# Patient Record
Sex: MALE | Race: WHITE | NOT HISPANIC OR LATINO | URBAN - METROPOLITAN AREA
[De-identification: names, ages, dates, MRNs, and addresses within clinical notes are randomized per-mention and may not be internally consistent; named-entity substitution may affect disease eponyms.]

---

## 2024-01-19 PROBLEM — E78.5 HYPERLIPIDEMIA: Status: ACTIVE | Noted: 2024-01-19

## 2024-01-19 PROBLEM — G47.33 OBSTRUCTIVE SLEEP APNEA SYNDROME: Status: ACTIVE | Noted: 2024-01-19

## 2024-01-19 PROBLEM — R36.1 HEMOSPERMIA: Status: ACTIVE | Noted: 2024-01-19

## 2024-01-19 PROBLEM — E11.9 TYPE 2 DIABETES MELLITUS (MULTI): Status: ACTIVE | Noted: 2024-01-19

## 2024-01-19 PROBLEM — I10 ESSENTIAL HYPERTENSION: Status: ACTIVE | Noted: 2024-01-19

## 2024-01-19 PROBLEM — E11.65 HYPERGLYCEMIA DUE TO TYPE 2 DIABETES MELLITUS (MULTI): Status: ACTIVE | Noted: 2024-01-19

## 2024-01-19 RX ORDER — ASPIRIN 325 MG
TABLET ORAL
COMMUNITY
End: 2024-04-19 | Stop reason: WASHOUT

## 2024-01-19 RX ORDER — LATANOPROST 50 UG/ML
1 SOLUTION/ DROPS OPHTHALMIC NIGHTLY
COMMUNITY
Start: 2023-11-02

## 2024-01-19 RX ORDER — DAPAGLIFLOZIN 10 MG/1
TABLET, FILM COATED ORAL EVERY 24 HOURS
COMMUNITY
Start: 2023-08-25 | End: 2024-01-22 | Stop reason: SDUPTHER

## 2024-01-19 RX ORDER — TIZANIDINE 4 MG/1
TABLET ORAL EVERY 8 HOURS
COMMUNITY
Start: 2022-11-14 | End: 2024-01-22 | Stop reason: WASHOUT

## 2024-01-19 RX ORDER — PIOGLITAZONEHYDROCHLORIDE 15 MG/1
TABLET ORAL EVERY 24 HOURS
COMMUNITY
Start: 2020-09-28 | End: 2024-01-22 | Stop reason: WASHOUT

## 2024-01-19 RX ORDER — LISINOPRIL 10 MG/1
TABLET ORAL EVERY 24 HOURS
COMMUNITY
End: 2024-02-18

## 2024-01-19 RX ORDER — METFORMIN HYDROCHLORIDE 500 MG/1
1000 TABLET, EXTENDED RELEASE ORAL 2 TIMES DAILY
Qty: 360 TABLET | Refills: 0 | OUTPATIENT
Start: 2024-01-19

## 2024-01-19 RX ORDER — METFORMIN HYDROCHLORIDE 500 MG/1
TABLET, EXTENDED RELEASE ORAL EVERY 12 HOURS
COMMUNITY
End: 2024-01-22 | Stop reason: SDUPTHER

## 2024-01-19 RX ORDER — ATORVASTATIN CALCIUM 20 MG/1
1 TABLET, FILM COATED ORAL DAILY
COMMUNITY
End: 2024-02-18

## 2024-01-20 PROBLEM — Z79.84 LONG TERM (CURRENT) USE OF ORAL HYPOGLYCEMIC DRUGS: Status: ACTIVE | Noted: 2024-01-20

## 2024-01-20 NOTE — PROGRESS NOTES
HPI:       Diabetes Mellitus:          The patient is complaining of nothing; he is overdue for appt for [facility] dm. Last seen in Aug 23.  At that time he was started on farxiga, his pioglitazone stopped due to possible wt gain; he remained on his metformin.  He has not lost any wt being off pioglitazone for 6 months.         Hypoglycemic episodes are none         The results of the last HbgA1c were increased at 7.6; today it is 7.9         The microalbumin normal         The feet/last exam done 8/25/23 and normal         Last eye exam 8/2022 and pt has been reminded he is overdue         Side effects of the medications include wt gain with pioglitazone         Compliance with the medical regimen has been Fair       Hypertension:          The patient has no issues         The patients cardiovascular risk factors include:         Cardiac Risk Factors  Age > 45-male, > 55-female:  YES  +1   Smoking:   NO   Sig. family hx of CHD*:  YES  +1   Hypertension:   YES  +1   Diabetes:   YES  +1   HDL < 35:   NO   HDL > 59:   NO   Total: 4     *- Sig. family h/o CHD per NCEP = MI or sudden death at <54yo in   father or other 1st-degree male relative, or <64yo in mother or   other 1st-degree female relative           The patients adherence to the treatment regimen is Fair         Responses to the medications has been Fair    Hyperlipidemia:   The patient does not use medications that may worsen dyslipidemias (corticosteroids, progestins, anabolic steroids, diuretics, beta-blockers, amiodarone, cyclosporine, olanzapine). The patient exercises infrequently.  The patient is not known to have coexisting coronary artery disease.      MEDICAL HISTORY:   Past Medical History:   Diagnosis Date    Hematospermia     History of COVID-19 12/2021    HTN (hypertension)     Hyperlipidemia     Long term (current) use of oral hypoglycemic drugs     GUANAKITO (obstructive sleep apnea)     Uncontrolled type 2 diabetes mellitus with hyperglycemia (CMS/[facility])       MEDICATIONS:   Current Outpatient Medications   Medication Sig Dispense Refill    aspirin 325 mg tablet 1 tablet Orally AS NEEDED      atorvastatin (Lipitor) 20 mg tablet Take 1 tablet (20 mg) by mouth once daily.      latanoprost (Xalatan) 0.005 % ophthalmic solution Administer 1 drop into both eyes once daily at bedtime.      lisinopril 10 mg tablet once every 24 hours.      MULTIVITAMIN ORAL once every 24 hours.      Farxiga 10 mg Take 1 tablet (10 mg) by mouth once every 24 hours. 30 tablet 2    metFORMIN  mg 24 hr tablet Take 1 tablet (500 mg) by mouth every 12 hours. 60 tablet 2    semaglutide (Rybelsus) 3 mg tablet Take 1 tablet (3 mg) by mouth once daily. 30 tablet 0     No current facility-administered medications for this visit.     ALLERGIES:   Allergies   Allergen Reactions    Shellfish Containing Products Unknown     FAMILY HISTORY:   Family History   Problem Relation Name Age of Onset    No Known Problems Mother      Hypertension Father      Coronary artery disease Father      Colon cancer Sister          age 53    No Known Problems Daughter      No Known Problems Daughter      No Known Problems Daughter       SOCIAL HISTORY:   Social History     Tobacco Use    Smoking status: Never    Smokeless tobacco: Never   Vaping Use    Vaping Use: Never used   Substance Use Topics    Alcohol use: Yes    Drug use: Never         ROS:      CONSTITUTION:  alert and oriented     OPHTHALMOLOGY:          no Change in vision.         CARDIOLOGY:          no Chest pain.  no Dyspnea on exertion.  no Shortness of breath.         ENDOCRINOLOGY:          no Excessive thirst.  no Excessive urination.  no Fatigue.  no Skin changes.  no Weight gain.  no Weight loss.         SKIN:          no Healing problems.         NEUROLOGY:          no Loss of sensation in specific body area.  no Burning pain in feet.  no Peripheral neuropathy.  no Tingling/numbness.    LABS: due     VITAL SIGNS:  /80   Pulse 72   Wt 103  kg (226 lb)   BMI 35.93 kg/m²     General Appearance: awake, alert, oriented, in no acute distress  Lungs: Normal expansion.  Clear to auscultation.  No rales, rhonchi, or wheezing.  Heart: Heart sounds are normal.  Regular rate and rhythm without murmur, gallop or rub.  Extremities: Extremities warm to touch, pink, with no edema.  Neurologic: Alert and oriented x 3, gait normal., reflexes normal and symmetric, strength and  sensation grossly normal    Thang was seen today for diabetes.  Diagnoses and all orders for this visit:  Uncontrolled type 2 diabetes mellitus with hyperglycemia (CMS/[facility]) (Primary)  -     Albumin , Urine Random; Future  -     Basic Metabolic Panel; Future  -     Urinalysis with Reflex Microscopic; Future  -     POCT glycosylated hemoglobin (Hb A1C) manually resulted  -     Farxiga 10 mg; Take 1 tablet (10 mg) by mouth once every 24 hours.  -     metFORMIN  mg 24 hr tablet; Take 1 tablet (500 mg) by mouth every 12 hours.  -     semaglutide (Rybelsus) 3 mg tablet; Take 1 tablet (3 mg) by mouth once daily.  -     Follow Up In Primary Care - Established; Future  Essential hypertension  Mixed hyperlipidemia  -     Lipid Panel; Future  Long term (current) use of oral hypoglycemic drugs

## 2024-01-22 ENCOUNTER — LAB (OUTPATIENT)
Dept: LAB | Facility: LAB | Age: 64
End: 2024-01-22
Payer: COMMERCIAL

## 2024-01-22 ENCOUNTER — OFFICE VISIT (OUTPATIENT)
Dept: PRIMARY CARE | Facility: CLINIC | Age: 64
End: 2024-01-22
Payer: COMMERCIAL

## 2024-01-22 VITALS
HEART RATE: 72 BPM | WEIGHT: 226 LBS | SYSTOLIC BLOOD PRESSURE: 128 MMHG | DIASTOLIC BLOOD PRESSURE: 80 MMHG | BODY MASS INDEX: 35.93 KG/M2

## 2024-01-22 DIAGNOSIS — E11.65 UNCONTROLLED TYPE 2 DIABETES MELLITUS WITH HYPERGLYCEMIA (MULTI): Primary | ICD-10-CM

## 2024-01-22 DIAGNOSIS — E78.2 MIXED HYPERLIPIDEMIA: ICD-10-CM

## 2024-01-22 DIAGNOSIS — I10 ESSENTIAL HYPERTENSION: ICD-10-CM

## 2024-01-22 DIAGNOSIS — E11.65 UNCONTROLLED TYPE 2 DIABETES MELLITUS WITH HYPERGLYCEMIA (MULTI): ICD-10-CM

## 2024-01-22 DIAGNOSIS — Z79.84 LONG TERM (CURRENT) USE OF ORAL HYPOGLYCEMIC DRUGS: ICD-10-CM

## 2024-01-22 LAB
ANION GAP SERPL CALC-SCNC: 11 MMOL/L (ref 10–20)
APPEARANCE UR: ABNORMAL
BILIRUB UR STRIP.AUTO-MCNC: NEGATIVE MG/DL
BUN SERPL-MCNC: 14 MG/DL (ref 6–23)
CALCIUM SERPL-MCNC: 9.6 MG/DL (ref 8.6–10.3)
CHLORIDE SERPL-SCNC: 105 MMOL/L (ref 98–107)
CHOLEST SERPL-MCNC: 122 MG/DL (ref 0–199)
CHOLESTEROL/HDL RATIO: 2.2
CO2 SERPL-SCNC: 28 MMOL/L (ref 21–32)
COLOR UR: YELLOW
CREAT SERPL-MCNC: 0.87 MG/DL (ref 0.5–1.3)
CREAT UR-MCNC: 102.4 MG/DL (ref 20–370)
EGFRCR SERPLBLD CKD-EPI 2021: >90 ML/MIN/1.73M*2
GLUCOSE SERPL-MCNC: 168 MG/DL (ref 74–99)
GLUCOSE UR STRIP.AUTO-MCNC: NEGATIVE MG/DL
HDLC SERPL-MCNC: 56.7 MG/DL
KETONES UR STRIP.AUTO-MCNC: NEGATIVE MG/DL
LDLC SERPL CALC-MCNC: 54 MG/DL
LEUKOCYTE ESTERASE UR QL STRIP.AUTO: NEGATIVE
MICROALBUMIN UR-MCNC: 14.2 MG/L
MICROALBUMIN/CREAT UR: 13.9 UG/MG CREAT
NITRITE UR QL STRIP.AUTO: NEGATIVE
NON HDL CHOLESTEROL: 65 MG/DL (ref 0–149)
PH UR STRIP.AUTO: 5 [PH]
POC HEMOGLOBIN A1C: 7.9 % (ref 4.2–6.5)
POTASSIUM SERPL-SCNC: 4.6 MMOL/L (ref 3.5–5.3)
PROT UR STRIP.AUTO-MCNC: NEGATIVE MG/DL
RBC # UR STRIP.AUTO: NEGATIVE /UL
SODIUM SERPL-SCNC: 139 MMOL/L (ref 136–145)
SP GR UR STRIP.AUTO: 1.02
TRIGL SERPL-MCNC: 55 MG/DL (ref 0–149)
UROBILINOGEN UR STRIP.AUTO-MCNC: <2 MG/DL
VLDL: 11 MG/DL (ref 0–40)

## 2024-01-22 PROCEDURE — 4010F ACE/ARB THERAPY RXD/TAKEN: CPT | Performed by: FAMILY MEDICINE

## 2024-01-22 PROCEDURE — 3079F DIAST BP 80-89 MM HG: CPT | Performed by: FAMILY MEDICINE

## 2024-01-22 PROCEDURE — 99214 OFFICE O/P EST MOD 30 MIN: CPT | Performed by: FAMILY MEDICINE

## 2024-01-22 PROCEDURE — 82570 ASSAY OF URINE CREATININE: CPT

## 2024-01-22 PROCEDURE — 3074F SYST BP LT 130 MM HG: CPT | Performed by: FAMILY MEDICINE

## 2024-01-22 PROCEDURE — 82043 UR ALBUMIN QUANTITATIVE: CPT

## 2024-01-22 PROCEDURE — 36415 COLL VENOUS BLD VENIPUNCTURE: CPT

## 2024-01-22 PROCEDURE — 83036 HEMOGLOBIN GLYCOSYLATED A1C: CPT | Performed by: FAMILY MEDICINE

## 2024-01-22 PROCEDURE — 1036F TOBACCO NON-USER: CPT | Performed by: FAMILY MEDICINE

## 2024-01-22 RX ORDER — METFORMIN HYDROCHLORIDE 500 MG/1
500 TABLET, EXTENDED RELEASE ORAL EVERY 12 HOURS
Qty: 60 TABLET | Refills: 2 | Status: SHIPPED | OUTPATIENT
Start: 2024-01-22 | End: 2024-04-19 | Stop reason: SDUPTHER

## 2024-01-22 RX ORDER — DAPAGLIFLOZIN 10 MG/1
10 TABLET, FILM COATED ORAL EVERY 24 HOURS
Qty: 30 TABLET | Refills: 2 | Status: SHIPPED | OUTPATIENT
Start: 2024-01-22 | End: 2024-04-19 | Stop reason: SDUPTHER

## 2024-01-22 ASSESSMENT — PAIN SCALES - GENERAL: PAINLEVEL: 0-NO PAIN

## 2024-01-22 ASSESSMENT — PATIENT HEALTH QUESTIONNAIRE - PHQ9
1. LITTLE INTEREST OR PLEASURE IN DOING THINGS: NOT AT ALL
SUM OF ALL RESPONSES TO PHQ9 QUESTIONS 1 AND 2: 0
2. FEELING DOWN, DEPRESSED OR HOPELESS: NOT AT ALL

## 2024-01-22 NOTE — RESULT ENCOUNTER NOTE
1.  Bmp is fine though elevated glucose c/w his hga1c done in office; [facility]. Dm; ke 1yr  2. Lipids are fine; ke 1 yr

## 2024-02-11 NOTE — PROGRESS NOTES
HPI:       Diabetes Mellitus:          Pt rtc 1 mos after starting rybelsus 3 mg but he never started it since [employer] did not have them in stock.  He is maxed out on metformin and   farxiga.  Fasting sugars around 70 and after dinner 130.         Hypoglycemic episodes are none         The results of the last HbgA1c was elevated at 7.9.  Today it 7.3 after 1 month         The microalbumin normal         The feet/last exam done 8/25/23 and normal         Last eye exam 9/2023 and no retinopathy         Side effects of the medications include wt gain with pioglitazone         Compliance with the medical regimen has been Fair       Hypertension:          The patient has no issues         The patients cardiovascular risk factors include:         Cardiac Risk Factors  Age > 45-male, > 55-female:  YES  +1   Smoking:   NO   Sig. family hx of CHD*:  YES  +1   Hypertension:   YES  +1   Diabetes:   YES  +1   HDL < 35:   NO   HDL > 59:   NO   Total: 4     *- Sig. family h/o CHD per NCEP = MI or sudden death at <54yo in   father or other 1st-degree male relative, or <66yo in mother or   other 1st-degree female relative           The patients adherence to the treatment regimen is Fair         Responses to the medications has been Fair    Hyperlipidemia:   The patient does not use medications that may worsen dyslipidemias (corticosteroids, progestins, anabolic steroids, diuretics, beta-blockers, amiodarone, cyclosporine, olanzapine). The patient exercises infrequently.  The patient is not known to have coexisting coronary artery disease.      MEDICAL HISTORY:   Past Medical History:   Diagnosis Date    Hematospermia     History of COVID-19 12/2021    HTN (hypertension)     Hyperlipidemia     Long term (current) use of oral hypoglycemic drugs     GUANAKITO (obstructive sleep apnea)     Uncontrolled type 2 diabetes mellitus with hyperglycemia (CMS/[facility])      MEDICATIONS:   Current Outpatient Medications   Medication Sig Dispense Refill     aspirin 325 mg tablet 1 tablet Orally AS NEEDED      atorvastatin (Lipitor) 20 mg tablet Take 1 tablet by mouth once daily. 90 tablet 3    Farxiga 10 mg Take 1 tablet (10 mg) by mouth once every 24 hours. 30 tablet 2    latanoprost (Xalatan) 0.005 % ophthalmic solution Administer 1 drop into both eyes once daily at bedtime.      lisinopril 10 mg tablet Take 1 tablet by mouth once daily. 90 tablet 0    metFORMIN  mg 24 hr tablet Take 1 tablet (500 mg) by mouth every 12 hours. 60 tablet 2    MULTIVITAMIN ORAL once every 24 hours.      semaglutide (Rybelsus) 3 mg tablet Take 1 tablet (3 mg) by mouth once daily. 30 tablet 0     No current facility-administered medications for this visit.     ALLERGIES:   Allergies   Allergen Reactions    Shellfish Containing Products Swelling     FAMILY HISTORY:   Family History   Problem Relation Name Age of Onset    No Known Problems Mother      Hypertension Father      Coronary artery disease Father      Colon cancer Sister          age 53    No Known Problems Daughter      No Known Problems Daughter      No Known Problems Daughter       SOCIAL HISTORY:   Social History     Tobacco Use    Smoking status: Never    Smokeless tobacco: Never   Vaping Use    Vaping Use: Never used   Substance Use Topics    Alcohol use: Yes    Drug use: Never         ROS:      CONSTITUTION:  alert and oriented     OPHTHALMOLOGY:          no Change in vision.         CARDIOLOGY:          no Chest pain.  no Dyspnea on exertion.  no Shortness of breath.         ENDOCRINOLOGY:          no Excessive thirst.  no Excessive urination.  no Fatigue.  no Skin changes.  no Weight gain.  no Weight loss.         SKIN:          no Healing problems.         NEUROLOGY:          no Loss of sensation in specific body area.  no Burning pain in feet.  no Peripheral neuropathy.  no Tingling/numbness.    LABS:  Lab Results   Component Value Date    GLUCOSE 168 (H) 01/22/2024    CALCIUM 9.6 01/22/2024     01/22/2024  "   K 4.6 01/22/2024    CO2 28 01/22/2024     01/22/2024    BUN 14 01/22/2024    CREATININE 0.87 01/22/2024     Lab Results   Component Value Date    CHOL 122 01/22/2024    CHOL 123 (L) 03/08/2023    CHOL 164 11/27/2021     Lab Results   Component Value Date    HDL 56.7 01/22/2024    HDL 56 03/08/2023    HDL 51 11/27/2021     Lab Results   Component Value Date    LDLCALC 54 01/22/2024    LDLCALC 55 (L) 03/08/2023    LDLCALC 89 11/27/2021     Lab Results   Component Value Date    TRIG 55 01/22/2024    TRIG 59 03/08/2023    TRIG 120 11/27/2021     No components found for: \"CHOLHDL\"  Lab Results   Component Value Date    ALBUR 20 03/08/2023          VITAL SIGNS:  /76   Pulse 77   Wt 102 kg (223 lb 12.8 oz)   SpO2 98%   BMI 35.58 kg/m²     General Appearance: awake, alert, oriented, in no acute distress  Lungs: Normal expansion.  Clear to auscultation.  No rales, rhonchi, or wheezing.  Heart: Heart sounds are normal.  Regular rate and rhythm without murmur, gallop or rub.  Extremities: Extremities warm to touch, pink, with no edema.  Neurologic: Alert and oriented x 3, gait normal., reflexes normal and symmetric, strength and  sensation grossly normal    Thang was seen today for diabetes.  Diagnoses and all orders for this visit:  Uncontrolled type 2 diabetes mellitus with hyperglycemia (CMS/HCC) (Primary)  Comments:  ke 2 mos again w/o rybelsis; cont diet/exercise farxiga and metformin  Orders:  -     POCT glycosylated hemoglobin (Hb A1C) manually resulted  -     Follow Up In Primary Care - Established  -     Follow Up In Primary Care - Established; Future  Long term (current) use of oral hypoglycemic drugs  Essential hypertension  Comments:  cont lisinopril  Mixed hyperlipidemia  Comments:  cont atorvastastin             "

## 2024-02-18 DIAGNOSIS — I10 ESSENTIAL HYPERTENSION: ICD-10-CM

## 2024-02-18 DIAGNOSIS — E78.2 MIXED HYPERLIPIDEMIA: Primary | ICD-10-CM

## 2024-02-18 RX ORDER — ATORVASTATIN CALCIUM 20 MG/1
20 TABLET, FILM COATED ORAL DAILY
Qty: 90 TABLET | Refills: 3 | Status: SHIPPED | OUTPATIENT
Start: 2024-02-18

## 2024-02-18 RX ORDER — LISINOPRIL 10 MG/1
10 TABLET ORAL DAILY
Qty: 90 TABLET | Refills: 0 | Status: SHIPPED | OUTPATIENT
Start: 2024-02-18 | End: 2024-04-19 | Stop reason: SDUPTHER

## 2024-02-23 ENCOUNTER — OFFICE VISIT (OUTPATIENT)
Dept: PRIMARY CARE | Facility: CLINIC | Age: 64
End: 2024-02-23
Payer: COMMERCIAL

## 2024-02-23 VITALS
HEART RATE: 77 BPM | BODY MASS INDEX: 35.58 KG/M2 | OXYGEN SATURATION: 98 % | DIASTOLIC BLOOD PRESSURE: 76 MMHG | WEIGHT: 223.8 LBS | SYSTOLIC BLOOD PRESSURE: 124 MMHG

## 2024-02-23 DIAGNOSIS — Z79.84 LONG TERM (CURRENT) USE OF ORAL HYPOGLYCEMIC DRUGS: ICD-10-CM

## 2024-02-23 DIAGNOSIS — E11.65 UNCONTROLLED TYPE 2 DIABETES MELLITUS WITH HYPERGLYCEMIA (MULTI): Primary | ICD-10-CM

## 2024-02-23 DIAGNOSIS — I10 ESSENTIAL HYPERTENSION: ICD-10-CM

## 2024-02-23 DIAGNOSIS — E78.2 MIXED HYPERLIPIDEMIA: ICD-10-CM

## 2024-02-23 LAB — POC HEMOGLOBIN A1C: 7.3 % (ref 4.2–6.5)

## 2024-02-23 PROCEDURE — 3074F SYST BP LT 130 MM HG: CPT | Performed by: FAMILY MEDICINE

## 2024-02-23 PROCEDURE — 3061F NEG MICROALBUMINURIA REV: CPT | Performed by: FAMILY MEDICINE

## 2024-02-23 PROCEDURE — 3048F LDL-C <100 MG/DL: CPT | Performed by: FAMILY MEDICINE

## 2024-02-23 PROCEDURE — 3078F DIAST BP <80 MM HG: CPT | Performed by: FAMILY MEDICINE

## 2024-02-23 PROCEDURE — 99214 OFFICE O/P EST MOD 30 MIN: CPT | Performed by: FAMILY MEDICINE

## 2024-02-23 PROCEDURE — 1036F TOBACCO NON-USER: CPT | Performed by: FAMILY MEDICINE

## 2024-02-23 PROCEDURE — 83036 HEMOGLOBIN GLYCOSYLATED A1C: CPT | Mod: 91,QW | Performed by: FAMILY MEDICINE

## 2024-02-23 PROCEDURE — 4010F ACE/ARB THERAPY RXD/TAKEN: CPT | Performed by: FAMILY MEDICINE

## 2024-02-23 ASSESSMENT — PATIENT HEALTH QUESTIONNAIRE - PHQ9
1. LITTLE INTEREST OR PLEASURE IN DOING THINGS: NOT AT ALL
2. FEELING DOWN, DEPRESSED OR HOPELESS: NOT AT ALL
SUM OF ALL RESPONSES TO PHQ9 QUESTIONS 1 AND 2: 0

## 2024-02-23 ASSESSMENT — ENCOUNTER SYMPTOMS
LOSS OF SENSATION IN FEET: 0
OCCASIONAL FEELINGS OF UNSTEADINESS: 0
DEPRESSION: 0

## 2024-04-01 NOTE — PROGRESS NOTES
HPI:       Diabetes Mellitus:          Pt rtc 3 mos after starting rybelsus 3 mg  He is maxed out on metformin and   farxiga.          Glucose Readings:          Hypoglycemic episodes are none         The results of the last HbgA1c was elevated at 7.3; today it is: 7.4         The microalbumin normal         The feet/last exam done 8/25/23 and normal         Last eye exam 9/2023 and no retinopathy         Side effects of the medications include wt gain with pioglitazone         Compliance with the medical regimen has been Fair       Hypertension:          The patient has no issues         The patients cardiovascular risk factors include:         Cardiac Risk Factors  Age > 45-male, > 55-female:  YES  +1   Smoking:   NO   Sig. family hx of CHD*:  YES  +1   Hypertension:   YES  +1   Diabetes:   YES  +1   HDL < 35:   NO   HDL > 59:   NO   Total: 4     *- Sig. family h/o CHD per NCEP = MI or sudden death at <56yo in   father or other 1st-degree male relative, or <66yo in mother or   other 1st-degree female relative           The patients adherence to the treatment regimen is Fair         Responses to the medications has been Fair    Hyperlipidemia:   The patient does not use medications that may worsen dyslipidemias (corticosteroids, progestins, anabolic steroids, diuretics, beta-blockers, amiodarone, cyclosporine, olanzapine). The patient exercises infrequently.  The patient is not known to have coexisting coronary artery disease.  Pt is on a statin      MEDICAL HISTORY:   Past Medical History:   Diagnosis Date    Hematospermia     History of COVID-19 12/2021    HTN (hypertension)     Hyperlipidemia     Long term (current) use of oral hypoglycemic drugs     GUANAKITO (obstructive sleep apnea)     Uncontrolled type 2 diabetes mellitus with hyperglycemia (Multi)      MEDICATIONS:   Current Outpatient Medications   Medication Sig Dispense Refill    atorvastatin (Lipitor) 20 mg tablet Take 1 tablet by mouth once daily. 90 tablet  3    latanoprost (Xalatan) 0.005 % ophthalmic solution Administer 1 drop into both eyes once daily at bedtime.      MULTIVITAMIN ORAL once every 24 hours.      Farxiga 10 mg Take 1 tablet (10 mg) by mouth once every 24 hours. 30 tablet 2    lisinopril 10 mg tablet Take 1 tablet (10 mg) by mouth once daily. 30 tablet 2    metFORMIN  mg 24 hr tablet Take 1 tablet (500 mg) by mouth every 12 hours. 60 tablet 2    pioglitazone (Actos) 15 mg tablet Take 1 tablet (15 mg) by mouth once daily. 30 tablet 2     No current facility-administered medications for this visit.     ALLERGIES:   Allergies   Allergen Reactions    Shellfish Containing Products Swelling     FAMILY HISTORY:   Family History   Problem Relation Name Age of Onset    No Known Problems Mother      Hypertension Father      Coronary artery disease Father      Colon cancer Sister          age 53    No Known Problems Daughter      No Known Problems Daughter      No Known Problems Daughter       SOCIAL HISTORY:   Social History     Tobacco Use    Smoking status: Never    Smokeless tobacco: Never   Vaping Use    Vaping status: Never Used   Substance Use Topics    Alcohol use: Yes    Drug use: Never         ROS:      CONSTITUTION:  alert and oriented     OPHTHALMOLOGY:          no Change in vision.         CARDIOLOGY:          no Chest pain.  no Dyspnea on exertion.  no Shortness of breath.         ENDOCRINOLOGY:          no Excessive thirst.  no Excessive urination.  no Fatigue.  no Skin changes.  no Weight gain.  no Weight loss.         SKIN:          no Healing problems.         NEUROLOGY:          no Loss of sensation in specific body area.  no Burning pain in feet.  no Peripheral neuropathy.  no Tingling/numbness.    LABS:  Lab Results   Component Value Date    GLUCOSE 168 (H) 01/22/2024    CALCIUM 9.6 01/22/2024     01/22/2024    K 4.6 01/22/2024    CO2 28 01/22/2024     01/22/2024    BUN 14 01/22/2024    CREATININE 0.87 01/22/2024     Lab  "Results   Component Value Date    CHOL 122 01/22/2024    CHOL 123 (L) 03/08/2023    CHOL 164 11/27/2021     Lab Results   Component Value Date    HDL 56.7 01/22/2024    HDL 56 03/08/2023    HDL 51 11/27/2021     Lab Results   Component Value Date    LDLCALC 54 01/22/2024    LDLCALC 55 (L) 03/08/2023    LDLCALC 89 11/27/2021     Lab Results   Component Value Date    TRIG 55 01/22/2024    TRIG 59 03/08/2023    TRIG 120 11/27/2021     No components found for: \"CHOLHDL\"  Lab Results   Component Value Date    ALBUR 20 03/08/2023          VITAL SIGNS:  /74   Pulse 74   Wt 97.6 kg (215 lb 3.2 oz)   SpO2 99%   BMI 34.21 kg/m²     General Appearance: awake, alert, oriented, in no acute distress  Lungs: Normal expansion.  Clear to auscultation.  No rales, rhonchi, or wheezing.  Heart: Heart sounds are normal.  Regular rate and rhythm without murmur, gallop or rub.  Extremities: Extremities warm to touch, pink, with no edema.  Neurologic: Alert and oriented x 3, gait normal., reflexes normal and symmetric, strength and  sensation grossly normal    Thang was seen today for diabetes.  Diagnoses and all orders for this visit:  Uncontrolled type 2 diabetes mellitus with hyperglycemia (Multi) (Primary)  -     POCT glycosylated hemoglobin (Hb A1C) manually resulted  -     Follow Up In Primary Care - Established  -     metFORMIN  mg 24 hr tablet; Take 1 tablet (500 mg) by mouth every 12 hours.  -     Farxiga 10 mg; Take 1 tablet (10 mg) by mouth once every 24 hours.  -     pioglitazone (Actos) 15 mg tablet; Take 1 tablet (15 mg) by mouth once daily.  -     Follow Up In Primary Care - Established; Future  Essential hypertension  -     lisinopril 10 mg tablet; Take 1 tablet (10 mg) by mouth once daily.  Mixed hyperlipidemia  Comments:  cont statin  Long term (current) use of oral hypoglycemic drugs               "

## 2024-04-19 ENCOUNTER — OFFICE VISIT (OUTPATIENT)
Dept: PRIMARY CARE | Facility: CLINIC | Age: 64
End: 2024-04-19
Payer: COMMERCIAL

## 2024-04-19 VITALS
SYSTOLIC BLOOD PRESSURE: 124 MMHG | BODY MASS INDEX: 34.21 KG/M2 | OXYGEN SATURATION: 99 % | WEIGHT: 215.2 LBS | DIASTOLIC BLOOD PRESSURE: 74 MMHG | HEART RATE: 74 BPM

## 2024-04-19 DIAGNOSIS — I10 ESSENTIAL HYPERTENSION: ICD-10-CM

## 2024-04-19 DIAGNOSIS — E11.65 UNCONTROLLED TYPE 2 DIABETES MELLITUS WITH HYPERGLYCEMIA (MULTI): Primary | ICD-10-CM

## 2024-04-19 DIAGNOSIS — Z79.84 LONG TERM (CURRENT) USE OF ORAL HYPOGLYCEMIC DRUGS: ICD-10-CM

## 2024-04-19 DIAGNOSIS — E78.2 MIXED HYPERLIPIDEMIA: ICD-10-CM

## 2024-04-19 LAB — POC HEMOGLOBIN A1C: 7.4 % (ref 4.2–6.5)

## 2024-04-19 PROCEDURE — 3061F NEG MICROALBUMINURIA REV: CPT | Performed by: FAMILY MEDICINE

## 2024-04-19 PROCEDURE — 4010F ACE/ARB THERAPY RXD/TAKEN: CPT | Performed by: FAMILY MEDICINE

## 2024-04-19 PROCEDURE — 99214 OFFICE O/P EST MOD 30 MIN: CPT | Performed by: FAMILY MEDICINE

## 2024-04-19 PROCEDURE — 83036 HEMOGLOBIN GLYCOSYLATED A1C: CPT | Performed by: FAMILY MEDICINE

## 2024-04-19 PROCEDURE — 3048F LDL-C <100 MG/DL: CPT | Performed by: FAMILY MEDICINE

## 2024-04-19 PROCEDURE — 3078F DIAST BP <80 MM HG: CPT | Performed by: FAMILY MEDICINE

## 2024-04-19 PROCEDURE — 3074F SYST BP LT 130 MM HG: CPT | Performed by: FAMILY MEDICINE

## 2024-04-19 PROCEDURE — 1036F TOBACCO NON-USER: CPT | Performed by: FAMILY MEDICINE

## 2024-04-19 RX ORDER — PIOGLITAZONEHYDROCHLORIDE 15 MG/1
15 TABLET ORAL DAILY
Qty: 30 TABLET | Refills: 2 | Status: SHIPPED | OUTPATIENT
Start: 2024-04-19 | End: 2024-07-18

## 2024-04-19 RX ORDER — DAPAGLIFLOZIN 10 MG/1
10 TABLET, FILM COATED ORAL EVERY 24 HOURS
Qty: 30 TABLET | Refills: 2 | Status: SHIPPED | OUTPATIENT
Start: 2024-04-19 | End: 2024-07-18

## 2024-04-19 RX ORDER — METFORMIN HYDROCHLORIDE 500 MG/1
500 TABLET, EXTENDED RELEASE ORAL EVERY 12 HOURS
Qty: 60 TABLET | Refills: 2 | Status: SHIPPED | OUTPATIENT
Start: 2024-04-19 | End: 2024-07-18

## 2024-04-19 RX ORDER — LISINOPRIL 10 MG/1
10 TABLET ORAL DAILY
Qty: 30 TABLET | Refills: 2 | Status: SHIPPED | OUTPATIENT
Start: 2024-04-19 | End: 2024-07-18

## 2024-04-19 ASSESSMENT — ENCOUNTER SYMPTOMS
DEPRESSION: 0
OCCASIONAL FEELINGS OF UNSTEADINESS: 0
LOSS OF SENSATION IN FEET: 0

## 2024-04-19 ASSESSMENT — PAIN SCALES - GENERAL: PAINLEVEL: 0-NO PAIN

## 2024-05-31 NOTE — PROGRESS NOTES
HPI:       Diabetes Mellitus:          Pt rtc 3 mos after restarting pioglitazone.  He is maxed out on metformin and   farxiga. He could not afford the rybelsus previously rx'd          Glucose Readings:          Hypoglycemic episodes are none         The results of the last HbgA1c was elevated at 7.4.  Today it is:          The microalbumin normal         The feet/last exam done 8/25/23 and normal         Last eye exam 9/2023 and no retinopathy         Side effects of the medications include wt gain with pioglitazone         Compliance with the medical regimen has been Fair       Hypertension:          The patient has no issues         The patients cardiovascular risk factors include:         Cardiac Risk Factors  Age > 45-male, > 55-female:  YES  +1   Smoking:   NO   Sig. family hx of CHD*:  YES  +1   Hypertension:   YES  +1   Diabetes:   YES  +1   HDL < 35:   NO   HDL > 59:   NO   Total: 4     *- Sig. family h/o CHD per NCEP = MI or sudden death at <56yo in   father or other 1st-degree male relative, or <64yo in mother or   other 1st-degree female relative           The patients adherence to the treatment regimen is Fair         Responses to the medications has been Fair    Hyperlipidemia:   The patient does not use medications that may worsen dyslipidemias (corticosteroids, progestins, anabolic steroids, diuretics, beta-blockers, amiodarone, cyclosporine, olanzapine). The patient exercises infrequently.  The patient is not known to have coexisting coronary artery disease.  Pt is on a statin      MEDICAL HISTORY:   Past Medical History:   Diagnosis Date    Hematospermia     History of COVID-19 12/2021    HTN (hypertension)     Hyperlipidemia     Long term (current) use of oral hypoglycemic drugs     GUANAKITO (obstructive sleep apnea)     Uncontrolled type 2 diabetes mellitus with hyperglycemia (Multi)      MEDICATIONS:   Current Outpatient Medications   Medication Sig Dispense Refill    atorvastatin (Lipitor) 20 mg  tablet Take 1 tablet by mouth once daily. 90 tablet 3    Farxiga 10 mg Take 1 tablet (10 mg) by mouth once every 24 hours. 30 tablet 2    latanoprost (Xalatan) 0.005 % ophthalmic solution Administer 1 drop into both eyes once daily at bedtime.      lisinopril 10 mg tablet Take 1 tablet (10 mg) by mouth once daily. 30 tablet 2    metFORMIN  mg 24 hr tablet Take 1 tablet (500 mg) by mouth every 12 hours. 60 tablet 2    MULTIVITAMIN ORAL once every 24 hours.      pioglitazone (Actos) 15 mg tablet Take 1 tablet (15 mg) by mouth once daily. 30 tablet 2     No current facility-administered medications for this visit.     ALLERGIES:   Allergies   Allergen Reactions    Shellfish Containing Products Swelling     FAMILY HISTORY:   Family History   Problem Relation Name Age of Onset    No Known Problems Mother      Hypertension Father      Coronary artery disease Father      Colon cancer Sister          age 53    No Known Problems Daughter      No Known Problems Daughter      No Known Problems Daughter       SOCIAL HISTORY:   Social History     Tobacco Use    Smoking status: Never    Smokeless tobacco: Never   Vaping Use    Vaping status: Never Used   Substance Use Topics    Alcohol use: Yes    Drug use: Never         ROS:      CONSTITUTION:  alert and oriented     OPHTHALMOLOGY:          no Change in vision.         CARDIOLOGY:          no Chest pain.  no Dyspnea on exertion.  no Shortness of breath.         ENDOCRINOLOGY:          no Excessive thirst.  no Excessive urination.  no Fatigue.  no Skin changes.  no Weight gain.  no Weight loss.         SKIN:          no Healing problems.         NEUROLOGY:          no Loss of sensation in specific body area.  no Burning pain in feet.  no Peripheral neuropathy.  no Tingling/numbness.    LABS:  Lab Results   Component Value Date    GLUCOSE 168 (H) 01/22/2024    CALCIUM 9.6 01/22/2024     01/22/2024    K 4.6 01/22/2024    CO2 28 01/22/2024     01/22/2024    BUN 14  01/22/2024    CREATININE 0.87 01/22/2024     Lab Results   Component Value Date    CHOL 122 01/22/2024    CHOL 123 (L) 03/08/2023    CHOL 164 11/27/2021     Lab Results   Component Value Date    HDL 56.7 01/22/2024    HDL 56 03/08/2023    HDL 51 11/27/2021     Lab Results   Component Value Date    LDLCALC 54 01/22/2024    LDLCALC 55 (L) 03/08/2023    LDLCALC 89 11/27/2021     Lab Results   Component Value Date    TRIG 55 01/22/2024    TRIG 59 03/08/2023    TRIG 120 11/27/2021      Latest Reference Range & Units Most Recent   Albumin, Urine Random Not established mg/L 14.2  1/22/24 10:12   Creatinine, Urine Random 20.0 - 370.0 mg/dL 102.4  1/22/24 10:12   Albumin/Creatine Ratio <30.0 ug/mg Creat 13.9  1/22/24 10:12              VITAL SIGNS:  There were no vitals taken for this visit.    General Appearance: awake, alert, oriented, in no acute distress  Lungs: Normal expansion.  Clear to auscultation.  No rales, rhonchi, or wheezing.  Heart: Heart sounds are normal.  Regular rate and rhythm without murmur, gallop or rub.  Extremities: Extremities warm to touch, pink, with no edema.  Neurologic: Alert and oriented x 3, gait normal., reflexes normal and symmetric, strength and  sensation grossly normal    Diagnoses and all orders for this visit:  Uncontrolled type 2 diabetes mellitus with hyperglycemia (Multi) (Primary)  Essential hypertension  Mixed hyperlipidemia  Long term (current) use of oral hypoglycemic drugs

## 2024-07-05 ENCOUNTER — APPOINTMENT (OUTPATIENT)
Dept: PRIMARY CARE | Facility: CLINIC | Age: 64
End: 2024-07-05
Payer: COMMERCIAL

## 2024-07-05 DIAGNOSIS — Z79.84 LONG TERM (CURRENT) USE OF ORAL HYPOGLYCEMIC DRUGS: ICD-10-CM

## 2024-07-05 DIAGNOSIS — E78.2 MIXED HYPERLIPIDEMIA: ICD-10-CM

## 2024-07-05 DIAGNOSIS — I10 ESSENTIAL HYPERTENSION: ICD-10-CM

## 2024-07-05 DIAGNOSIS — E11.65 UNCONTROLLED TYPE 2 DIABETES MELLITUS WITH HYPERGLYCEMIA (MULTI): Primary | ICD-10-CM

## 2024-07-05 NOTE — PROGRESS NOTES
HPI:       Diabetes Mellitus:          Pt rtc 3 mos after restarting pioglitazone.  He is maxed out on metformin and   farxiga. He could not afford the rybelsus previously rx'd          Glucose Readings:          Hypoglycemic episodes are none         The results of the last HbgA1c was elevated at 7.4.  Today it is: 7.2         The microalbumin normal         The feet/last exam done 7/9/24         Last eye exam 9/2023 and no retinopathy         Side effects of the medications include wt gain with pioglitazone         Compliance with the medical regimen has been Fair.  HE has lost 12 lbs since Jan '24.       Hypertension:          The patient has no issues         The patients cardiovascular risk factors include:         Cardiac Risk Factors  Age > 45-male, > 55-female:  YES  +1   Smoking:   NO   Sig. family hx of CHD*:  YES  +1   Hypertension:   YES  +1   Diabetes:   YES  +1   HDL < 35:   NO   HDL > 59:   NO   Total: 4     *- Sig. family h/o CHD per NCEP = MI or sudden death at <54yo in   father or other 1st-degree male relative, or <66yo in mother or   other 1st-degree female relative           The patients adherence to the treatment regimen is Fair         Responses to the medications has been Fair    Hyperlipidemia:   The patient does not use medications that may worsen dyslipidemias (corticosteroids, progestins, anabolic steroids, diuretics, beta-blockers, amiodarone, cyclosporine, olanzapine). The patient exercises infrequently.  The patient is not known to have coexisting coronary artery disease.  Pt is on a statin      MEDICAL HISTORY:   Past Medical History:   Diagnosis Date    Allergic     Hematospermia     History of COVID-19 12/2021    HTN (hypertension)     Hyperlipidemia     Long term (current) use of oral hypoglycemic drugs     GUANAKITO (obstructive sleep apnea)     Uncontrolled type 2 diabetes mellitus with hyperglycemia (Multi)     Varicella     Visual impairment      MEDICATIONS:   Current Outpatient  Medications   Medication Sig Dispense Refill    atorvastatin (Lipitor) 20 mg tablet Take 1 tablet by mouth once daily. 90 tablet 3    Farxiga 10 mg Take 1 tablet (10 mg) by mouth once every 24 hours. 30 tablet 2    latanoprost (Xalatan) 0.005 % ophthalmic solution Administer 1 drop into both eyes once daily at bedtime.      lisinopril 10 mg tablet Take 1 tablet (10 mg) by mouth once daily. 30 tablet 2    MULTIVITAMIN ORAL once every 24 hours.      pioglitazone (Actos) 15 mg tablet Take 1 tablet (15 mg) by mouth once daily. 30 tablet 2    metFORMIN  mg 24 hr tablet Take 2 tablets (1,000 mg) by mouth every 12 hours. 120 tablet 2     No current facility-administered medications for this visit.     ALLERGIES:   Allergies   Allergen Reactions    Shellfish Containing Products Swelling     FAMILY HISTORY:   Family History   Problem Relation Name Age of Onset    No Known Problems Mother      Hypertension Father Vu     Coronary artery disease Father Vu     Colon cancer Sister Maru         age 53    No Known Problems Daughter      No Known Problems Daughter      No Known Problems Daughter       SOCIAL HISTORY:   Social History     Tobacco Use    Smoking status: Never    Smokeless tobacco: Never   Vaping Use    Vaping status: Never Used   Substance Use Topics    Alcohol use: Yes    Drug use: Never         ROS:      CONSTITUTION:  alert and oriented     OPHTHALMOLOGY:          no Change in vision.         CARDIOLOGY:          no Chest pain.  no Dyspnea on exertion.  no Shortness of breath.         ENDOCRINOLOGY:          no Excessive thirst.  no Excessive urination.  no Fatigue.  no Skin changes.  no Weight gain.  no Weight loss.         SKIN:          no Healing problems.         NEUROLOGY:          no Loss of sensation in specific body area.  no Burning pain in feet.  no Peripheral neuropathy.  no Tingling/numbness.    LABS:  Lab Results   Component Value Date    GLUCOSE 168 (H) 01/22/2024    CALCIUM 9.6  01/22/2024     01/22/2024    K 4.6 01/22/2024    CO2 28 01/22/2024     01/22/2024    BUN 14 01/22/2024    CREATININE 0.87 01/22/2024     Lab Results   Component Value Date    CHOL 122 01/22/2024    CHOL 123 (L) 03/08/2023    CHOL 164 11/27/2021     Lab Results   Component Value Date    HDL 56.7 01/22/2024    HDL 56 03/08/2023    HDL 51 11/27/2021     Lab Results   Component Value Date    LDLCALC 54 01/22/2024    LDLCALC 55 (L) 03/08/2023    LDLCALC 89 11/27/2021     Lab Results   Component Value Date    TRIG 55 01/22/2024    TRIG 59 03/08/2023    TRIG 120 11/27/2021      Latest Reference Range & Units Most Recent   Albumin, Urine Random Not established mg/L 14.2  1/22/24 10:12   Creatinine, Urine Random 20.0 - 370.0 mg/dL 102.4  1/22/24 10:12   Albumin/Creatine Ratio <30.0 ug/mg Creat 13.9  1/22/24 10:12              VITAL SIGNS:  /68   Pulse 70   Wt 97.1 kg (214 lb)   SpO2 98%   BMI 34.02 kg/m²     General Appearance: awake, alert, oriented, in no acute distress  Lungs: Normal expansion.  Clear to auscultation.  No rales, rhonchi, or wheezing.  Heart: Heart sounds are normal.  Regular rate and rhythm without murmur, gallop or rub.  Extremities: Extremities warm to touch, pink, with no edema.  Neurologic: Alert and oriented x 3, gait normal., reflexes normal and symmetric, strength and  sensation grossly normal    Thang was seen today for diabetes.  Diagnoses and all orders for this visit:  Uncontrolled type 2 diabetes mellitus with hyperglycemia (Multi) (Primary)  Comments:  cont pioglitazone and farxiga  Orders:  -     POCT glycosylated hemoglobin (Hb A1C) manually resulted  -     Follow Up In Primary Care - Established  -     metFORMIN  mg 24 hr tablet; Take 2 tablets (1,000 mg) by mouth every 12 hours.  -     Follow Up In Primary Care - Established; Future  Essential hypertension  Comments:  cont lisinopril  Mixed hyperlipidemia  Comments:  cont atorvastatin  Long term (current) use  of oral hypoglycemic drugs

## 2024-07-09 ENCOUNTER — OFFICE VISIT (OUTPATIENT)
Dept: PRIMARY CARE | Facility: CLINIC | Age: 64
End: 2024-07-09
Payer: COMMERCIAL

## 2024-07-09 VITALS
WEIGHT: 214 LBS | HEART RATE: 70 BPM | BODY MASS INDEX: 34.02 KG/M2 | OXYGEN SATURATION: 98 % | SYSTOLIC BLOOD PRESSURE: 124 MMHG | DIASTOLIC BLOOD PRESSURE: 68 MMHG

## 2024-07-09 DIAGNOSIS — Z79.84 LONG TERM (CURRENT) USE OF ORAL HYPOGLYCEMIC DRUGS: ICD-10-CM

## 2024-07-09 DIAGNOSIS — E11.65 UNCONTROLLED TYPE 2 DIABETES MELLITUS WITH HYPERGLYCEMIA (MULTI): Primary | ICD-10-CM

## 2024-07-09 DIAGNOSIS — I10 ESSENTIAL HYPERTENSION: ICD-10-CM

## 2024-07-09 DIAGNOSIS — E78.2 MIXED HYPERLIPIDEMIA: ICD-10-CM

## 2024-07-09 LAB — POC HEMOGLOBIN A1C: 7.2 % (ref 4.2–6.5)

## 2024-07-09 PROCEDURE — 3074F SYST BP LT 130 MM HG: CPT | Performed by: FAMILY MEDICINE

## 2024-07-09 PROCEDURE — 1036F TOBACCO NON-USER: CPT | Performed by: FAMILY MEDICINE

## 2024-07-09 PROCEDURE — 99214 OFFICE O/P EST MOD 30 MIN: CPT | Performed by: FAMILY MEDICINE

## 2024-07-09 PROCEDURE — 3048F LDL-C <100 MG/DL: CPT | Performed by: FAMILY MEDICINE

## 2024-07-09 PROCEDURE — 4010F ACE/ARB THERAPY RXD/TAKEN: CPT | Performed by: FAMILY MEDICINE

## 2024-07-09 PROCEDURE — 83036 HEMOGLOBIN GLYCOSYLATED A1C: CPT | Performed by: FAMILY MEDICINE

## 2024-07-09 PROCEDURE — 3078F DIAST BP <80 MM HG: CPT | Performed by: FAMILY MEDICINE

## 2024-07-09 PROCEDURE — 3061F NEG MICROALBUMINURIA REV: CPT | Performed by: FAMILY MEDICINE

## 2024-07-09 RX ORDER — METFORMIN HYDROCHLORIDE 500 MG/1
1000 TABLET, EXTENDED RELEASE ORAL EVERY 12 HOURS
Qty: 120 TABLET | Refills: 2 | Status: SHIPPED | OUTPATIENT
Start: 2024-07-09 | End: 2024-10-07

## 2024-07-09 ASSESSMENT — PATIENT HEALTH QUESTIONNAIRE - PHQ9
SUM OF ALL RESPONSES TO PHQ9 QUESTIONS 1 AND 2: 0
1. LITTLE INTEREST OR PLEASURE IN DOING THINGS: NOT AT ALL
2. FEELING DOWN, DEPRESSED OR HOPELESS: NOT AT ALL

## 2024-07-09 ASSESSMENT — PAIN SCALES - GENERAL: PAINLEVEL: 0-NO PAIN

## 2024-07-17 DIAGNOSIS — E11.65 UNCONTROLLED TYPE 2 DIABETES MELLITUS WITH HYPERGLYCEMIA (MULTI): ICD-10-CM

## 2024-07-17 RX ORDER — DAPAGLIFLOZIN 10 MG/1
TABLET, FILM COATED ORAL
Qty: 30 TABLET | Refills: 2 | Status: SHIPPED | OUTPATIENT
Start: 2024-07-17

## 2024-08-15 DIAGNOSIS — I10 ESSENTIAL HYPERTENSION: ICD-10-CM

## 2024-08-15 RX ORDER — LISINOPRIL 10 MG/1
10 TABLET ORAL DAILY
Qty: 30 TABLET | Refills: 5 | Status: SHIPPED | OUTPATIENT
Start: 2024-08-15

## 2024-08-25 NOTE — PROGRESS NOTES
HPI:       Diabetes Mellitus:          Pt rtc 3 mos for dm ck  He is maxed out on metformin and farxiga and now on pioglitazone low dose since higher doses cause wt gain.  He can't afford rybelsus.         Glucose Readings:          Hypoglycemic episodes are none         The results of the last HbgA1c was elevated at 7.2; today it is: 7.7; eating too many pretzels/fruit at lunch.  Doesn't eat breakfast. Dinner has potatoes and meat.           The microalbumin normal         The feet/last exam done 7/9/24         Last eye exam Oct 2024 with Marianela; no retinopathy'           Side effects of the medications include wt gain with pioglitazone         Compliance with the medical regimen has been Fair.  HE has lost 12 lbs since Jan '24.       Hypertension:          The patient has no issues         The patients cardiovascular risk factors include:         Cardiac Risk Factors  Age > 45-male, > 55-female:  YES  +1   Smoking:   NO   Sig. family hx of CHD*:  YES  +1   Hypertension:   YES  +1   Diabetes:   YES  +1   HDL < 35:   NO   HDL > 59:   NO   Total: 4     *- Sig. family h/o CHD per NCEP = MI or sudden death at <54yo in   father or other 1st-degree male relative, or <64yo in mother or   other 1st-degree female relative           The patients adherence to the treatment regimen is Fair         Responses to the medications has been Fair    Hyperlipidemia:   The patient does not use medications that may worsen dyslipidemias (corticosteroids, progestins, anabolic steroids, diuretics, beta-blockers, amiodarone, cyclosporine, olanzapine). The patient exercises infrequently.  The patient is not known to have coexisting coronary artery disease.  Pt is on a statin      MEDICAL HISTORY:   Past Medical History:   Diagnosis Date    Allergic     Hematospermia     History of COVID-19 12/2021    HTN (hypertension)     Hyperlipidemia     Long term (current) use of oral hypoglycemic drugs     GUANAKITO (obstructive sleep apnea)     Uncontrolled  type 2 diabetes mellitus with hyperglycemia     Varicella     Visual impairment      MEDICATIONS:   Current Outpatient Medications   Medication Sig Dispense Refill    atorvastatin (Lipitor) 20 mg tablet Take 1 tablet by mouth once daily. 90 tablet 3    latanoprost (Xalatan) 0.005 % ophthalmic solution Administer 1 drop into both eyes once daily at bedtime.      lisinopril 10 mg tablet Take 1 tablet by mouth once daily. 30 tablet 5    MULTIVITAMIN ORAL once every 24 hours.      Farxiga 10 mg Take 1 tablet (10 mg) by mouth once daily. 30 tablet 2    metFORMIN  mg 24 hr tablet Take 2 tablets (1,000 mg) by mouth every 12 hours. 120 tablet 2    pioglitazone (Actos) 30 mg tablet Take 1 tablet (30 mg) by mouth once daily. 30 tablet 2     No current facility-administered medications for this visit.     ALLERGIES:   Allergies   Allergen Reactions    Shellfish Containing Products Swelling     FAMILY HISTORY:   Family History   Problem Relation Name Age of Onset    No Known Problems Mother      Hypertension Father Vu     Coronary artery disease Father Vu     Colon cancer Sister Maru         age 53    No Known Problems Daughter      No Known Problems Daughter      No Known Problems Daughter       SOCIAL HISTORY:   Social History     Tobacco Use    Smoking status: Never    Smokeless tobacco: Never   Vaping Use    Vaping status: Never Used   Substance Use Topics    Alcohol use: Yes    Drug use: Never         ROS:      CONSTITUTION:  alert and oriented     OPHTHALMOLOGY:          no Change in vision.         CARDIOLOGY:          no Chest pain.  no Dyspnea on exertion.  no Shortness of breath.         ENDOCRINOLOGY:          no Excessive thirst.  no Excessive urination.  no Fatigue.  no Skin changes.  no Weight gain.  no Weight loss.         SKIN:          no Healing problems.         NEUROLOGY:          no Loss of sensation in specific body area.  no Burning pain in feet.  no Peripheral neuropathy.  no  Tingling/numbness.    LABS:  Lab Results   Component Value Date    GLUCOSE 168 (H) 01/22/2024    CALCIUM 9.6 01/22/2024     01/22/2024    K 4.6 01/22/2024    CO2 28 01/22/2024     01/22/2024    BUN 14 01/22/2024    CREATININE 0.87 01/22/2024     Lab Results   Component Value Date    CHOL 122 01/22/2024    CHOL 123 (L) 03/08/2023    CHOL 164 11/27/2021     Lab Results   Component Value Date    HDL 56.7 01/22/2024    HDL 56 03/08/2023    HDL 51 11/27/2021     Lab Results   Component Value Date    LDLCALC 54 01/22/2024    LDLCALC 55 (L) 03/08/2023    LDLCALC 89 11/27/2021     Lab Results   Component Value Date    TRIG 55 01/22/2024    TRIG 59 03/08/2023    TRIG 120 11/27/2021      Latest Reference Range & Units Most Recent   Albumin, Urine Random Not established mg/L 14.2  1/22/24 10:12   Creatinine, Urine Random 20.0 - 370.0 mg/dL 102.4  1/22/24 10:12   Albumin/Creatine Ratio <30.0 ug/mg Creat 13.9  1/22/24 10:12              VITAL SIGNS:  /72   Pulse 72   Wt 97.3 kg (214 lb 6.4 oz)   SpO2 98%   BMI 34.09 kg/m²     General Appearance: awake, alert, oriented, in no acute distress  Lungs: Normal expansion.  Clear to auscultation.  No rales, rhonchi, or wheezing.  Heart: Heart sounds are normal.  Regular rate and rhythm without murmur, gallop or rub.  Extremities: Extremities warm to touch, pink, with no edema.  Neurologic: Alert and oriented x 3, gait normal., reflexes normal and symmetric, strength and  sensation grossly normal    Thang was seen today for diabetes.  Diagnoses and all orders for this visit:  Uncontrolled type 2 diabetes mellitus with hyperglycemia (Primary)  -     POCT glycosylated hemoglobin (Hb A1C) manually resulted  -     Follow Up In Primary Care - Established  -     Farxiga 10 mg; Take 1 tablet (10 mg) by mouth once daily.  -     metFORMIN  mg 24 hr tablet; Take 2 tablets (1,000 mg) by mouth every 12 hours.  -     pioglitazone (Actos) 30 mg tablet; Take 1 tablet (30  mg) by mouth once daily.  -     Follow Up In Primary Care - Established; Future  Essential hypertension  Comments:  cont lisinopril  Mixed hyperlipidemia  Comments:  cont atorvastatin  Long term (current) use of oral hypoglycemic drugs      Answers submitted by the patient for this visit:  High Blood Pressure Questionnaire (Submitted on 10/8/2024)  Chief Complaint: Hypertension  Condition status: controlled  anxiety: No  blurred vision: No  chest pain: No  headaches: No  malaise/fatigue: No  neck pain: No  orthopnea: No  palpitations: No  peripheral edema: No  PND: No  shortness of breath: No  sweats: No  Agents associated with hypertension: no associated agents  CAD risks: diabetes mellitus  Compliance problems: no compliance problems

## 2024-10-08 ENCOUNTER — OFFICE VISIT (OUTPATIENT)
Dept: PRIMARY CARE | Facility: CLINIC | Age: 64
End: 2024-10-08
Payer: COMMERCIAL

## 2024-10-08 VITALS
OXYGEN SATURATION: 98 % | DIASTOLIC BLOOD PRESSURE: 72 MMHG | WEIGHT: 214.4 LBS | BODY MASS INDEX: 34.09 KG/M2 | HEART RATE: 72 BPM | SYSTOLIC BLOOD PRESSURE: 124 MMHG

## 2024-10-08 DIAGNOSIS — I10 ESSENTIAL HYPERTENSION: ICD-10-CM

## 2024-10-08 DIAGNOSIS — E11.65 UNCONTROLLED TYPE 2 DIABETES MELLITUS WITH HYPERGLYCEMIA: Primary | ICD-10-CM

## 2024-10-08 DIAGNOSIS — Z79.84 LONG TERM (CURRENT) USE OF ORAL HYPOGLYCEMIC DRUGS: ICD-10-CM

## 2024-10-08 DIAGNOSIS — E78.2 MIXED HYPERLIPIDEMIA: ICD-10-CM

## 2024-10-08 LAB — POC HEMOGLOBIN A1C: 7.7 % (ref 4.2–6.5)

## 2024-10-08 PROCEDURE — 99214 OFFICE O/P EST MOD 30 MIN: CPT | Performed by: FAMILY MEDICINE

## 2024-10-08 PROCEDURE — 83036 HEMOGLOBIN GLYCOSYLATED A1C: CPT | Performed by: FAMILY MEDICINE

## 2024-10-08 PROCEDURE — 3074F SYST BP LT 130 MM HG: CPT | Performed by: FAMILY MEDICINE

## 2024-10-08 PROCEDURE — 3048F LDL-C <100 MG/DL: CPT | Performed by: FAMILY MEDICINE

## 2024-10-08 PROCEDURE — 3061F NEG MICROALBUMINURIA REV: CPT | Performed by: FAMILY MEDICINE

## 2024-10-08 PROCEDURE — 3078F DIAST BP <80 MM HG: CPT | Performed by: FAMILY MEDICINE

## 2024-10-08 PROCEDURE — 4010F ACE/ARB THERAPY RXD/TAKEN: CPT | Performed by: FAMILY MEDICINE

## 2024-10-08 PROCEDURE — 1036F TOBACCO NON-USER: CPT | Performed by: FAMILY MEDICINE

## 2024-10-08 RX ORDER — PIOGLITAZONEHYDROCHLORIDE 30 MG/1
30 TABLET ORAL DAILY
Qty: 30 TABLET | Refills: 2 | Status: SHIPPED | OUTPATIENT
Start: 2024-10-08 | End: 2025-01-06

## 2024-10-08 RX ORDER — METFORMIN HYDROCHLORIDE 500 MG/1
1000 TABLET, EXTENDED RELEASE ORAL EVERY 12 HOURS
Qty: 120 TABLET | Refills: 2 | Status: SHIPPED | OUTPATIENT
Start: 2024-10-08 | End: 2025-01-06

## 2024-10-08 RX ORDER — DAPAGLIFLOZIN 10 MG/1
10 TABLET, FILM COATED ORAL DAILY
Qty: 30 TABLET | Refills: 2 | Status: SHIPPED | OUTPATIENT
Start: 2024-10-08 | End: 2025-01-06

## 2024-10-08 ASSESSMENT — PATIENT HEALTH QUESTIONNAIRE - PHQ9
2. FEELING DOWN, DEPRESSED OR HOPELESS: NOT AT ALL
1. LITTLE INTEREST OR PLEASURE IN DOING THINGS: NOT AT ALL
SUM OF ALL RESPONSES TO PHQ9 QUESTIONS 1 AND 2: 0

## 2024-10-08 ASSESSMENT — ENCOUNTER SYMPTOMS
SHORTNESS OF BREATH: 0
PALPITATIONS: 0
PND: 0
BLURRED VISION: 0
SWEATS: 0
ORTHOPNEA: 0
HEADACHES: 0
NECK PAIN: 0
HYPERTENSION: 1

## 2024-10-08 ASSESSMENT — PAIN SCALES - GENERAL: PAINLEVEL: 0-NO PAIN

## 2024-11-17 NOTE — PROGRESS NOTES
HPI:    rtc for 3 mos ck on [facility] dm; at last visit we increased his pioglitazone .  He is maxed out on metformin and farxiga and can't afford glp1's     Diabetes Mellitus:          Pt rtc 3 mos for dm ck          Glucose Readings:          Hypoglycemic episodes are none         The results of the last HbgA1c was elevated at 7.7 ; today it is: 7.6         The microalbumin normal         The feet/last exam done 7/9/24;          Last eye exam Oct 2024 with Marianela; no retinopathy         Side effects of the medications include wt gain with pioglitazone         Compliance with the medical regimen has been Good.  HE has lost 12 lbs since Jan '24.       Hypertension:          The patient has no issues         The patients cardiovascular risk factors include:         Cardiac Risk Factors  Age > 45-male, > 55-female:  YES  +1   Smoking:   NO   Sig. family hx of CHD*:  YES  +1   Hypertension:   YES  +1   Diabetes:   YES  +1   HDL < 35:   NO   HDL > 59:   NO   Total: 4     *- Sig. family h/o CHD per NCEP = MI or sudden death at <56yo in   father or other 1st-degree male relative, or <66yo in mother or   other 1st-degree female relative           The patients adherence to the treatment regimen is Fair         Responses to the medications has been Fair    Hyperlipidemia:   The patient does not use medications that may worsen dyslipidemias (corticosteroids, progestins, anabolic steroids, diuretics, beta-blockers, amiodarone, cyclosporine, olanzapine). The patient exercises infrequently.  The patient is not known to have coexisting coronary artery disease.  Pt is on a statin      MEDICAL HISTORY:   Past Medical History:   Diagnosis Date    Allergic     Hematospermia     History of COVID-19 12/2021    HTN (hypertension)     Hyperlipidemia     Long term (current) use of oral hypoglycemic drugs     GUANAKITO (obstructive sleep apnea)     Uncontrolled type 2 diabetes mellitus with hyperglycemia     Varicella     Visual impairment       MEDICATIONS:   Current Outpatient Medications   Medication Sig Dispense Refill    atorvastatin (Lipitor) 20 mg tablet Take 1 tablet by mouth once daily. 90 tablet 3    latanoprost (Xalatan) 0.005 % ophthalmic solution Administer 1 drop into both eyes once daily at bedtime.      lisinopril 10 mg tablet Take 1 tablet by mouth once daily. 30 tablet 5    MULTIVITAMIN ORAL once every 24 hours.      Farxiga 10 mg Take 1 tablet (10 mg) by mouth once daily. 30 tablet 2    metFORMIN  mg 24 hr tablet Take 2 tablets (1,000 mg) by mouth every 12 hours. 120 tablet 2    pioglitazone (Actos) 30 mg tablet Take 1 tablet (30 mg) by mouth once daily. 30 tablet 2     No current facility-administered medications for this visit.     ALLERGIES:   Allergies   Allergen Reactions    Shellfish Containing Products Swelling     FAMILY HISTORY:   Family History   Problem Relation Name Age of Onset    No Known Problems Mother      Hypertension Father Vu     Coronary artery disease Father Vu     Colon cancer Sister Maru         age 53    No Known Problems Daughter      No Known Problems Daughter      No Known Problems Daughter       SOCIAL HISTORY:   Social History     Tobacco Use    Smoking status: Never    Smokeless tobacco: Never   Vaping Use    Vaping status: Never Used   Substance Use Topics    Alcohol use: Yes    Drug use: Never         ROS:      CONSTITUTION:  alert and oriented     OPHTHALMOLOGY:          no Change in vision.         CARDIOLOGY:          no Chest pain.  no Dyspnea on exertion.  no Shortness of breath.         ENDOCRINOLOGY:          no Excessive thirst.  no Excessive urination.  no Fatigue.  no Skin changes.  no Weight gain.  no Weight loss.         SKIN:          no Healing problems.         NEUROLOGY:          no Loss of sensation in specific body area.  no Burning pain in feet.  no Peripheral neuropathy.  no Tingling/numbness.    LABS:due       VITAL SIGNS:  /68   Pulse 66   Wt 97.8 kg (215  lb 9.6 oz)   SpO2 99%   BMI 34.28 kg/m²     General Appearance: awake, alert, oriented, in no acute distress  Lungs: Normal expansion.  Clear to auscultation.  No rales, rhonchi, or wheezing.  Heart: Heart sounds are normal.  Regular rate and rhythm without murmur, gallop or rub.  Extremities: Extremities warm to touch, pink, with no edema.  Neurologic: Alert and oriented x 3, gait normal., reflexes normal and symmetric, strength and  sensation grossly normal    Thang was seen today for diabetes.  Diagnoses and all orders for this visit:  Uncontrolled type 2 diabetes mellitus with hyperglycemia (Primary)  -     POCT glycosylated hemoglobin (Hb A1C) manually resulted  -     Albumin-Creatinine Ratio, Urine Random; Future  -     Basic Metabolic Panel; Future  -     Urinalysis with Reflex Microscopic; Future  -     Follow Up In Primary Care - Established  -     Farxiga 10 mg; Take 1 tablet (10 mg) by mouth once daily.  -     metFORMIN  mg 24 hr tablet; Take 2 tablets (1,000 mg) by mouth every 12 hours.  -     pioglitazone (Actos) 30 mg tablet; Take 1 tablet (30 mg) by mouth once daily.  Essential hypertension  Comments:  cont lisinopril  Mixed hyperlipidemia  Comments:  cont atorvastatin  Orders:  -     Lipid Panel; Future  Medication management  -     Vitamin B12; Future    Answers submitted by the patient for this visit:  Diabetes Questionnaire (Submitted on 12/20/2024)  Chief Complaint: Diabetes problem  Diabetes type: type 2  MedicAlert ID: No  Disease duration: 3 Years  blurred vision: No  chest pain: No  fatigue: No  foot paresthesias: No  foot ulcerations: No  polydipsia: Yes  polyphagia: No  polyuria: Yes  visual change: No  weakness: No  weight loss: No  Symptom course: stable  confusion: No  speech difficulty: No  dizziness: No  nervous/anxious: No  headaches: No  hunger: No  mood changes: No  pallor: No  seizures: No  tremors: No  sleepiness: No  sweats: No  blackouts: No  hospitalization:  No  nocturnal hypoglycemia: No  required assistance: No  required glucagon: No  CVA: No  heart disease: No  impotence: No  nephropathy: No  peripheral neuropathy: No  PVD: No  retinopathy: No  CAD risks: family history, hypertension, obesity  Current treatments: oral agent (monotherapy)  Treatment compliance: most of the time  Dose schedule: pre-breakfast  Monitoring compliance: poor  Blood glucose trend: no change  breakfast time: 7-8 am  lunch time: 12-1 pm  dinner time: 5-6 pm  Bedtime: 10-11 pm  Weight trend: stable  Current diet: generally healthy  Meal planning: none  Exercise: intermittently  Dietitian visit: No  Eye exam current: Yes  Sees podiatrist: No

## 2024-12-20 ENCOUNTER — LAB (OUTPATIENT)
Dept: LAB | Facility: LAB | Age: 64
End: 2024-12-20
Payer: COMMERCIAL

## 2024-12-20 ENCOUNTER — OFFICE VISIT (OUTPATIENT)
Dept: PRIMARY CARE | Facility: CLINIC | Age: 64
End: 2024-12-20
Payer: COMMERCIAL

## 2024-12-20 VITALS
DIASTOLIC BLOOD PRESSURE: 68 MMHG | HEART RATE: 66 BPM | WEIGHT: 215.6 LBS | SYSTOLIC BLOOD PRESSURE: 124 MMHG | OXYGEN SATURATION: 99 % | BODY MASS INDEX: 34.28 KG/M2

## 2024-12-20 DIAGNOSIS — E78.2 MIXED HYPERLIPIDEMIA: ICD-10-CM

## 2024-12-20 DIAGNOSIS — Z79.899 MEDICATION MANAGEMENT: ICD-10-CM

## 2024-12-20 DIAGNOSIS — E11.65 UNCONTROLLED TYPE 2 DIABETES MELLITUS WITH HYPERGLYCEMIA: Primary | ICD-10-CM

## 2024-12-20 DIAGNOSIS — I10 ESSENTIAL HYPERTENSION: ICD-10-CM

## 2024-12-20 DIAGNOSIS — E11.65 UNCONTROLLED TYPE 2 DIABETES MELLITUS WITH HYPERGLYCEMIA: ICD-10-CM

## 2024-12-20 PROBLEM — R36.1 HEMOSPERMIA: Status: RESOLVED | Noted: 2024-01-19 | Resolved: 2024-12-20

## 2024-12-20 LAB
ANION GAP SERPL CALC-SCNC: 8 MMOL/L (ref 10–20)
APPEARANCE UR: CLEAR
BILIRUB UR STRIP.AUTO-MCNC: NEGATIVE MG/DL
BUN SERPL-MCNC: 17 MG/DL (ref 6–23)
CALCIUM SERPL-MCNC: 9.4 MG/DL (ref 8.6–10.3)
CHLORIDE SERPL-SCNC: 103 MMOL/L (ref 98–107)
CHOLEST SERPL-MCNC: 155 MG/DL (ref 0–199)
CHOLESTEROL/HDL RATIO: 2
CO2 SERPL-SCNC: 31 MMOL/L (ref 21–32)
COLOR UR: ABNORMAL
CREAT SERPL-MCNC: 0.92 MG/DL (ref 0.5–1.3)
CREAT UR-MCNC: 76.9 MG/DL (ref 20–370)
EGFRCR SERPLBLD CKD-EPI 2021: >90 ML/MIN/1.73M*2
GLUCOSE SERPL-MCNC: 165 MG/DL (ref 74–99)
GLUCOSE UR STRIP.AUTO-MCNC: ABNORMAL MG/DL
HDLC SERPL-MCNC: 75.8 MG/DL
KETONES UR STRIP.AUTO-MCNC: NEGATIVE MG/DL
LDLC SERPL CALC-MCNC: 66 MG/DL
LEUKOCYTE ESTERASE UR QL STRIP.AUTO: NEGATIVE
MICROALBUMIN UR-MCNC: <7 MG/L
MICROALBUMIN/CREAT UR: NORMAL MG/G{CREAT}
NITRITE UR QL STRIP.AUTO: NEGATIVE
NON HDL CHOLESTEROL: 79 MG/DL (ref 0–149)
PH UR STRIP.AUTO: 5 [PH]
POC HEMOGLOBIN A1C: 7.6 % (ref 4.2–6.5)
POTASSIUM SERPL-SCNC: 4.7 MMOL/L (ref 3.5–5.3)
PROT UR STRIP.AUTO-MCNC: NEGATIVE MG/DL
RBC # UR STRIP.AUTO: NEGATIVE /UL
SODIUM SERPL-SCNC: 137 MMOL/L (ref 136–145)
SP GR UR STRIP.AUTO: 1.03
TRIGL SERPL-MCNC: 66 MG/DL (ref 0–149)
UROBILINOGEN UR STRIP.AUTO-MCNC: NORMAL MG/DL
VIT B12 SERPL-MCNC: 556 PG/ML (ref 211–911)
VLDL: 13 MG/DL (ref 0–40)

## 2024-12-20 PROCEDURE — 3078F DIAST BP <80 MM HG: CPT | Performed by: FAMILY MEDICINE

## 2024-12-20 PROCEDURE — 82570 ASSAY OF URINE CREATININE: CPT

## 2024-12-20 PROCEDURE — 4010F ACE/ARB THERAPY RXD/TAKEN: CPT | Performed by: FAMILY MEDICINE

## 2024-12-20 PROCEDURE — 82043 UR ALBUMIN QUANTITATIVE: CPT

## 2024-12-20 PROCEDURE — 3061F NEG MICROALBUMINURIA REV: CPT | Performed by: FAMILY MEDICINE

## 2024-12-20 PROCEDURE — 82607 VITAMIN B-12: CPT

## 2024-12-20 PROCEDURE — 99214 OFFICE O/P EST MOD 30 MIN: CPT | Performed by: FAMILY MEDICINE

## 2024-12-20 PROCEDURE — 3048F LDL-C <100 MG/DL: CPT | Performed by: FAMILY MEDICINE

## 2024-12-20 PROCEDURE — 1036F TOBACCO NON-USER: CPT | Performed by: FAMILY MEDICINE

## 2024-12-20 PROCEDURE — 36415 COLL VENOUS BLD VENIPUNCTURE: CPT

## 2024-12-20 PROCEDURE — 3074F SYST BP LT 130 MM HG: CPT | Performed by: FAMILY MEDICINE

## 2024-12-20 PROCEDURE — 83036 HEMOGLOBIN GLYCOSYLATED A1C: CPT | Performed by: FAMILY MEDICINE

## 2024-12-20 RX ORDER — METFORMIN HYDROCHLORIDE 500 MG/1
1000 TABLET, EXTENDED RELEASE ORAL EVERY 12 HOURS
Qty: 120 TABLET | Refills: 2 | Status: SHIPPED | OUTPATIENT
Start: 2024-12-20 | End: 2025-03-20

## 2024-12-20 RX ORDER — DAPAGLIFLOZIN 10 MG/1
10 TABLET, FILM COATED ORAL DAILY
Qty: 30 TABLET | Refills: 2 | Status: SHIPPED | OUTPATIENT
Start: 2024-12-20 | End: 2025-03-20

## 2024-12-20 RX ORDER — PIOGLITAZONEHYDROCHLORIDE 30 MG/1
30 TABLET ORAL DAILY
Qty: 30 TABLET | Refills: 2 | Status: SHIPPED | OUTPATIENT
Start: 2024-12-20 | End: 2025-03-20

## 2024-12-20 ASSESSMENT — ENCOUNTER SYMPTOMS
TREMORS: 0
BLACKOUTS: 0
POLYPHAGIA: 0
CONFUSION: 0
VISUAL CHANGE: 0
HEADACHES: 0
FATIGUE: 0
HUNGER: 0
WEAKNESS: 0
SWEATS: 0
DIZZINESS: 0
BLURRED VISION: 0
NERVOUS/ANXIOUS: 0
SEIZURES: 0
WEIGHT LOSS: 0
SPEECH DIFFICULTY: 0
POLYDIPSIA: 1

## 2024-12-20 ASSESSMENT — PAIN SCALES - GENERAL: PAINLEVEL_OUTOF10: 0-NO PAIN

## 2025-01-08 DIAGNOSIS — E78.2 MIXED HYPERLIPIDEMIA: ICD-10-CM

## 2025-01-08 RX ORDER — ATORVASTATIN CALCIUM 20 MG/1
20 TABLET, FILM COATED ORAL DAILY
Qty: 90 TABLET | Refills: 3 | Status: SHIPPED | OUTPATIENT
Start: 2025-01-08

## 2025-01-24 DIAGNOSIS — I10 ESSENTIAL HYPERTENSION: ICD-10-CM

## 2025-01-24 DIAGNOSIS — E78.2 MIXED HYPERLIPIDEMIA: ICD-10-CM

## 2025-01-24 DIAGNOSIS — E11.65 UNCONTROLLED TYPE 2 DIABETES MELLITUS WITH HYPERGLYCEMIA: ICD-10-CM

## 2025-01-24 RX ORDER — LISINOPRIL 10 MG/1
10 TABLET ORAL DAILY
Qty: 90 TABLET | Refills: 1 | Status: SHIPPED | OUTPATIENT
Start: 2025-01-24 | End: 2025-07-23

## 2025-01-24 RX ORDER — METFORMIN HYDROCHLORIDE 500 MG/1
1000 TABLET, EXTENDED RELEASE ORAL EVERY 12 HOURS
Qty: 360 TABLET | Refills: 0 | Status: SHIPPED | OUTPATIENT
Start: 2025-01-24 | End: 2025-04-24

## 2025-01-24 RX ORDER — DAPAGLIFLOZIN 10 MG/1
10 TABLET, FILM COATED ORAL DAILY
Qty: 90 TABLET | Refills: 0 | Status: SHIPPED | OUTPATIENT
Start: 2025-01-24 | End: 2025-04-24

## 2025-01-24 RX ORDER — ATORVASTATIN CALCIUM 20 MG/1
20 TABLET, FILM COATED ORAL DAILY
Qty: 90 TABLET | Refills: 3 | Status: SHIPPED | OUTPATIENT
Start: 2025-01-24

## 2025-01-24 RX ORDER — PIOGLITAZONEHYDROCHLORIDE 30 MG/1
30 TABLET ORAL DAILY
Qty: 90 TABLET | Refills: 0 | Status: SHIPPED | OUTPATIENT
Start: 2025-01-24 | End: 2025-04-24

## 2025-01-24 NOTE — TELEPHONE ENCOUNTER
Pt's spouse called asking if you can send in all his meds to [facility] in [place].  The [facility]RI pill pack is not able to refill at this time due to his new insurance.      Last OV 12/20/24

## 2025-01-29 ENCOUNTER — TELEPHONE (OUTPATIENT)
Dept: PRIMARY CARE | Facility: CLINIC | Age: 65
End: 2025-01-29
Payer: COMMERCIAL

## 2025-01-29 DIAGNOSIS — E11.65 UNCONTROLLED TYPE 2 DIABETES MELLITUS WITH HYPERGLYCEMIA: Primary | ICD-10-CM

## 2025-01-29 RX ORDER — GLIPIZIDE 10 MG/1
10 TABLET, FILM COATED, EXTENDED RELEASE ORAL 2 TIMES DAILY
COMMUNITY
End: 2025-01-29 | Stop reason: SDUPTHER

## 2025-01-29 RX ORDER — GLIPIZIDE 10 MG/1
10 TABLET, FILM COATED, EXTENDED RELEASE ORAL 2 TIMES DAILY
Qty: 180 TABLET | Refills: 0 | Status: SHIPPED | OUTPATIENT
Start: 2025-01-29 | End: 2025-04-29

## 2025-01-29 NOTE — TELEPHONE ENCOUNTER
Farxiga is too expensive (200$) and the patient would like to know if this med could be replaced.   Last appt 12/2024   Next appt 3/14/25

## 2025-02-03 NOTE — PROGRESS NOTES
HPI:    rtc for 3 mos ck on [facility] dm  He is maxed out on metformin and pioglitazone and had to stop his farxiga last month due to insurance cost.  Glipizide was started instead and maxed out     Diabetes Mellitus:          Pt rtc 3 mos for dm ck          Glucose Readings:          Hypoglycemic episodes are none         The results of the last HbgA1c was elevated at 7.6 ; today it is: 6.9         The microalbumin normal         The feet/last exam done 7/9/24;          Last eye exam Oct 2024 with Marianela; no retinopathy         Side effects of the medications include wt gain with pioglitazone; can't afford farxiga         Compliance with the medical regimen has been Good.  HE has lost 12 lbs since Jan '24.       Hypertension:          The patient has no issues         The patients cardiovascular risk factors include:         Cardiac Risk Factors  Age > 45-male, > 55-female:  YES  +1   Smoking:   NO   Sig. family hx of CHD*:  YES  +1   Hypertension:   YES  +1   Diabetes:   YES  +1   HDL < 35:   NO   HDL > 59:   NO   Total: 4     *- Sig. family h/o CHD per NCEP = MI or sudden death at <54yo in   father or other 1st-degree male relative, or <64yo in mother or   other 1st-degree female relative           The patients adherence to the treatment regimen is Fair         Responses to the medications has been Fair    Hyperlipidemia:   The patient does not use medications that may worsen dyslipidemias (corticosteroids, progestins, anabolic steroids, diuretics, beta-blockers, amiodarone, cyclosporine, olanzapine). The patient exercises infrequently.  The patient is not known to have coexisting coronary artery disease.  Pt is on a statin      MEDICAL HISTORY:   Past Medical History:   Diagnosis Date    Allergic     Hematospermia     History of COVID-19 12/2021    HTN (hypertension)     Hyperlipidemia     Long term (current) use of oral hypoglycemic drugs     GUANAKITO (obstructive sleep apnea)     Uncontrolled type 2 diabetes mellitus with  hyperglycemia     Varicella     Visual impairment      MEDICATIONS:   Current Outpatient Medications   Medication Sig Dispense Refill    atorvastatin (Lipitor) 20 mg tablet Take 1 tablet (20 mg) by mouth once daily. 90 tablet 3    latanoprost (Xalatan) 0.005 % ophthalmic solution Administer 1 drop into both eyes once daily at bedtime.      lisinopril 10 mg tablet Take 1 tablet (10 mg) by mouth once daily. 90 tablet 1    MULTIVITAMIN ORAL once every 24 hours.      glipiZIDE XL (Glucotrol XL) 10 mg 24 hr tablet Take 1 tablet (10 mg) by mouth 2 times a day. Do not crush, chew, or split. 180 tablet 1    metFORMIN  mg 24 hr tablet Take 2 tablets (1,000 mg) by mouth every 12 hours. 360 tablet 1    pioglitazone (Actos) 30 mg tablet Take 1 tablet (30 mg) by mouth once daily. 90 tablet 1     No current facility-administered medications for this visit.     ALLERGIES:   Allergies   Allergen Reactions    Shellfish Containing Products Swelling     FAMILY HISTORY:   Family History   Problem Relation Name Age of Onset    No Known Problems Mother      Hypertension Father Vu     Coronary artery disease Father Vu     Colon cancer Sister Maru         age 53    No Known Problems Daughter      No Known Problems Daughter      No Known Problems Daughter       SOCIAL HISTORY:   Social History     Tobacco Use    Smoking status: Never    Smokeless tobacco: Never   Vaping Use    Vaping status: Never Used   Substance Use Topics    Alcohol use: Yes    Drug use: Never         ROS:      CONSTITUTION:  alert and oriented     OPHTHALMOLOGY:          no Change in vision.         CARDIOLOGY:          no Chest pain.  no Dyspnea on exertion.  no Shortness of breath.         ENDOCRINOLOGY:          no Excessive thirst.  no Excessive urination.  no Fatigue.  no Skin changes.  no Weight gain.  no Weight loss.         SKIN:          no Healing problems.         NEUROLOGY:          no Loss of sensation in specific body area.  no Burning pain  "in feet.  no Peripheral neuropathy.  no Tingling/numbness.    LABS:  Lab Results   Component Value Date    GLUCOSE 165 (H) 12/20/2024    CALCIUM 9.4 12/20/2024     12/20/2024    K 4.7 12/20/2024    CO2 31 12/20/2024     12/20/2024    BUN 17 12/20/2024    CREATININE 0.92 12/20/2024     Lab Results   Component Value Date    CHOL 155 12/20/2024    CHOL 122 01/22/2024    CHOL 123 (L) 03/08/2023     Lab Results   Component Value Date    HDL 75.8 12/20/2024    HDL 56.7 01/22/2024    HDL 56 03/08/2023     Lab Results   Component Value Date    LDLCALC 66 12/20/2024    LDLCALC 54 01/22/2024    LDLCALC 55 (L) 03/08/2023     Lab Results   Component Value Date    TRIG 66 12/20/2024    TRIG 55 01/22/2024    TRIG 59 03/08/2023     No components found for: \"CHOLHDL\"   Latest Reference Range & Units 12/20/24 08:59   Albumin, Urine Random Not established mg/L <7.0   Creatinine, Urine Random 20.0 - 370.0 mg/dL 76.9          VITAL SIGNS:  /72   Pulse 75   Wt 104 kg (228 lb 3.2 oz)   SpO2 97%   BMI 36.28 kg/m²     General Appearance: awake, alert, oriented, in no acute distress  Lungs: Normal expansion.  Clear to auscultation.  No rales, rhonchi, or wheezing.  Heart: Heart sounds are normal.  Regular rate and rhythm without murmur, gallop or rub.  Extremities: Extremities warm to touch, pink, with no edema.  Neurologic: Alert and oriented x 3, gait normal., reflexes normal and symmetric, strength and  sensation grossly normal    Thang was seen today for diabetes.  Diagnoses and all orders for this visit:  Type 2 diabetes mellitus without complication, without long-term current use of insulin (Multi) (Primary)  -     POCT glycosylated hemoglobin (Hb A1C) manually resulted  -     Follow Up In Primary Care - Established  -     pioglitazone (Actos) 30 mg tablet; Take 1 tablet (30 mg) by mouth once daily.  -     metFORMIN  mg 24 hr tablet; Take 2 tablets (1,000 mg) by mouth every 12 hours.  -     glipiZIDE XL " (Glucotrol XL) 10 mg 24 hr tablet; Take 1 tablet (10 mg) by mouth 2 times a day. Do not crush, chew, or split.  -     Follow Up In Primary Care - Established; Future  Essential hypertension  Comments:  cont lisinopril  Mixed hyperlipidemia  Comments:  cont atorvastatin  Long term (current) use of oral hypoglycemic drugs

## 2025-03-13 RX ORDER — DAPAGLIFLOZIN 10 MG/1
10 TABLET, FILM COATED ORAL DAILY
COMMUNITY
Start: 2025-01-29 | End: 2025-03-14

## 2025-03-14 ENCOUNTER — OFFICE VISIT (OUTPATIENT)
Dept: PRIMARY CARE | Facility: CLINIC | Age: 65
End: 2025-03-14
Payer: COMMERCIAL

## 2025-03-14 VITALS
WEIGHT: 228.2 LBS | OXYGEN SATURATION: 97 % | SYSTOLIC BLOOD PRESSURE: 118 MMHG | HEART RATE: 75 BPM | BODY MASS INDEX: 36.28 KG/M2 | DIASTOLIC BLOOD PRESSURE: 72 MMHG

## 2025-03-14 DIAGNOSIS — Z79.84 LONG TERM (CURRENT) USE OF ORAL HYPOGLYCEMIC DRUGS: ICD-10-CM

## 2025-03-14 DIAGNOSIS — I10 ESSENTIAL HYPERTENSION: ICD-10-CM

## 2025-03-14 DIAGNOSIS — E78.2 MIXED HYPERLIPIDEMIA: ICD-10-CM

## 2025-03-14 DIAGNOSIS — E11.9 TYPE 2 DIABETES MELLITUS WITHOUT COMPLICATION, WITHOUT LONG-TERM CURRENT USE OF INSULIN (MULTI): Primary | ICD-10-CM

## 2025-03-14 LAB — POC HEMOGLOBIN A1C: 6.9 % (ref 4.2–6.5)

## 2025-03-14 PROCEDURE — 1036F TOBACCO NON-USER: CPT | Performed by: FAMILY MEDICINE

## 2025-03-14 PROCEDURE — 3074F SYST BP LT 130 MM HG: CPT | Performed by: FAMILY MEDICINE

## 2025-03-14 PROCEDURE — 3078F DIAST BP <80 MM HG: CPT | Performed by: FAMILY MEDICINE

## 2025-03-14 PROCEDURE — 4010F ACE/ARB THERAPY RXD/TAKEN: CPT | Performed by: FAMILY MEDICINE

## 2025-03-14 PROCEDURE — 99214 OFFICE O/P EST MOD 30 MIN: CPT | Performed by: FAMILY MEDICINE

## 2025-03-14 PROCEDURE — 83036 HEMOGLOBIN GLYCOSYLATED A1C: CPT | Performed by: FAMILY MEDICINE

## 2025-03-14 RX ORDER — METFORMIN HYDROCHLORIDE 500 MG/1
1000 TABLET, EXTENDED RELEASE ORAL EVERY 12 HOURS
Qty: 360 TABLET | Refills: 1 | Status: SHIPPED | OUTPATIENT
Start: 2025-03-14 | End: 2025-09-10

## 2025-03-14 RX ORDER — GLIPIZIDE 10 MG/1
10 TABLET, FILM COATED, EXTENDED RELEASE ORAL 2 TIMES DAILY
Qty: 180 TABLET | Refills: 1 | Status: SHIPPED | OUTPATIENT
Start: 2025-03-14 | End: 2025-09-10

## 2025-03-14 RX ORDER — PIOGLITAZONEHYDROCHLORIDE 30 MG/1
30 TABLET ORAL DAILY
Qty: 90 TABLET | Refills: 1 | Status: SHIPPED | OUTPATIENT
Start: 2025-03-14 | End: 2025-09-10

## 2025-03-14 ASSESSMENT — PAIN SCALES - GENERAL: PAINLEVEL_OUTOF10: 0-NO PAIN

## 2025-08-04 DIAGNOSIS — I10 ESSENTIAL HYPERTENSION: ICD-10-CM

## 2025-08-04 RX ORDER — LISINOPRIL 10 MG/1
10 TABLET ORAL DAILY
Qty: 90 TABLET | Refills: 0 | Status: SHIPPED | OUTPATIENT
Start: 2025-08-04

## 2025-08-29 ENCOUNTER — OFFICE VISIT (OUTPATIENT)
Dept: PRIMARY CARE | Facility: CLINIC | Age: 65
End: 2025-08-29
Payer: COMMERCIAL

## 2025-08-29 VITALS
HEART RATE: 72 BPM | OXYGEN SATURATION: 99 % | BODY MASS INDEX: 37.17 KG/M2 | SYSTOLIC BLOOD PRESSURE: 126 MMHG | WEIGHT: 233.8 LBS | DIASTOLIC BLOOD PRESSURE: 74 MMHG

## 2025-08-29 DIAGNOSIS — Z79.84 LONG TERM (CURRENT) USE OF ORAL HYPOGLYCEMIC DRUGS: ICD-10-CM

## 2025-08-29 DIAGNOSIS — E78.2 MIXED HYPERLIPIDEMIA: ICD-10-CM

## 2025-08-29 DIAGNOSIS — E11.9 TYPE 2 DIABETES MELLITUS WITHOUT COMPLICATION, WITHOUT LONG-TERM CURRENT USE OF INSULIN: Primary | ICD-10-CM

## 2025-08-29 DIAGNOSIS — I10 ESSENTIAL HYPERTENSION: ICD-10-CM

## 2025-08-29 LAB — POC HEMOGLOBIN A1C: 5.9 % (ref 4.2–6.5)

## 2025-08-29 PROCEDURE — 3044F HG A1C LEVEL LT 7.0%: CPT | Performed by: FAMILY MEDICINE

## 2025-08-29 PROCEDURE — 3078F DIAST BP <80 MM HG: CPT | Performed by: FAMILY MEDICINE

## 2025-08-29 PROCEDURE — 3074F SYST BP LT 130 MM HG: CPT | Performed by: FAMILY MEDICINE

## 2025-08-29 PROCEDURE — 99214 OFFICE O/P EST MOD 30 MIN: CPT | Performed by: FAMILY MEDICINE

## 2025-08-29 PROCEDURE — 1036F TOBACCO NON-USER: CPT | Performed by: FAMILY MEDICINE

## 2025-08-29 PROCEDURE — 4010F ACE/ARB THERAPY RXD/TAKEN: CPT | Performed by: FAMILY MEDICINE

## 2025-08-29 PROCEDURE — 83036 HEMOGLOBIN GLYCOSYLATED A1C: CPT | Performed by: FAMILY MEDICINE

## 2025-08-29 ASSESSMENT — PATIENT HEALTH QUESTIONNAIRE - PHQ9
SUM OF ALL RESPONSES TO PHQ9 QUESTIONS 1 AND 2: 0
2. FEELING DOWN, DEPRESSED OR HOPELESS: NOT AT ALL
1. LITTLE INTEREST OR PLEASURE IN DOING THINGS: NOT AT ALL

## 2025-08-29 ASSESSMENT — PAIN SCALES - GENERAL: PAINLEVEL_OUTOF10: 0-NO PAIN
